# Patient Record
Sex: FEMALE | Race: OTHER | NOT HISPANIC OR LATINO | URBAN - METROPOLITAN AREA
[De-identification: names, ages, dates, MRNs, and addresses within clinical notes are randomized per-mention and may not be internally consistent; named-entity substitution may affect disease eponyms.]

---

## 2024-04-20 ENCOUNTER — EMERGENCY (EMERGENCY)
Facility: HOSPITAL | Age: 74
LOS: 1 days | Discharge: LEFT BEFORE DISCHARGE | End: 2024-04-20
Admitting: EMERGENCY MEDICINE
Payer: MEDICARE

## 2024-04-20 VITALS
HEART RATE: 87 BPM | RESPIRATION RATE: 20 BRPM | HEIGHT: 65.35 IN | DIASTOLIC BLOOD PRESSURE: 87 MMHG | OXYGEN SATURATION: 95 % | SYSTOLIC BLOOD PRESSURE: 142 MMHG | WEIGHT: 169.98 LBS | TEMPERATURE: 98 F

## 2024-04-20 PROCEDURE — 80048 BASIC METABOLIC PNL TOTAL CA: CPT

## 2024-04-20 PROCEDURE — L9991: CPT

## 2024-04-20 PROCEDURE — 85025 COMPLETE CBC W/AUTO DIFF WBC: CPT

## 2024-04-20 PROCEDURE — 83690 ASSAY OF LIPASE: CPT

## 2024-04-20 PROCEDURE — 36415 COLL VENOUS BLD VENIPUNCTURE: CPT

## 2024-04-20 NOTE — ED ADULT TRIAGE NOTE - CHIEF COMPLAINT QUOTE
Pt presents to ED c.o abd pain w/ NBNB n/v/d x 3 months. Pt is primarily [place]ian speaking only. Pt has an appt with her GI doctor in May however pain is getting worse. PMHx of cirrhosis, HTN and depression. Denies CP, SOB fever or chills. Pt A&Ox4, conversive in full sentences and ambulatory.

## 2024-04-22 DIAGNOSIS — R10.9 UNSPECIFIED ABDOMINAL PAIN: ICD-10-CM

## 2024-04-22 DIAGNOSIS — Z53.21 PROCEDURE AND TREATMENT NOT CARRIED OUT DUE TO PATIENT LEAVING PRIOR TO BEING SEEN BY HEALTH CARE PROVIDER: ICD-10-CM
